# Patient Record
Sex: MALE | Race: WHITE | NOT HISPANIC OR LATINO | Employment: UNEMPLOYED | URBAN - METROPOLITAN AREA
[De-identification: names, ages, dates, MRNs, and addresses within clinical notes are randomized per-mention and may not be internally consistent; named-entity substitution may affect disease eponyms.]

---
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
## 2021-09-20 ENCOUNTER — HOSPITAL ENCOUNTER (EMERGENCY)
Facility: HOSPITAL | Age: 21
Discharge: HOME | End: 2021-09-20
Admitting: EMERGENCY MEDICINE

## 2021-09-20 VITALS
TEMPERATURE: 98 F | RESPIRATION RATE: 16 BRPM | DIASTOLIC BLOOD PRESSURE: 88 MMHG | BODY MASS INDEX: 26.59 KG/M2 | HEART RATE: 75 BPM | WEIGHT: 159.61 LBS | HEIGHT: 65 IN | OXYGEN SATURATION: 98 % | SYSTOLIC BLOOD PRESSURE: 145 MMHG
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
## 2021-09-20 DIAGNOSIS — S01.85XA DOG BITE OF FACE, INITIAL ENCOUNTER: Primary | ICD-10-CM

## 2021-09-20 PROCEDURE — 25010000002 TDAP 5-2.5-18.5 LF-MCG/0.5 SUSPENSION: Performed by: NURSE PRACTITIONER

## 2021-09-20 PROCEDURE — 90471 IMMUNIZATION ADMIN: CPT | Performed by: NURSE PRACTITIONER

## 2021-09-20 PROCEDURE — 99282 EMERGENCY DEPT VISIT SF MDM: CPT

## 2021-09-20 PROCEDURE — 90715 TDAP VACCINE 7 YRS/> IM: CPT | Performed by: NURSE PRACTITIONER

## 2021-09-20 RX ORDER — AMOXICILLIN AND CLAVULANATE POTASSIUM 875; 125 MG/1; MG/1
1 TABLET, FILM COATED ORAL EVERY 12 HOURS
Qty: 20 TABLET | Refills: 0 | Status: SHIPPED | OUTPATIENT
Start: 2021-09-20 | End: 2021-09-30

## 2021-09-20 RX ADMIN — TETANUS TOXOID, REDUCED DIPHTHERIA TOXOID AND ACELLULAR PERTUSSIS VACCINE, ADSORBED 0.5 ML: 5; 2.5; 8; 8; 2.5 SUSPENSION INTRAMUSCULAR at 15:47

## 2021-09-20 NOTE — ED PROVIDER NOTES
"Subjective   20-year-old male presents with complaint of a dog bite to his right cheek.  Patient stated \"my girlfriend was playing with her dog, it is a Rottweiler lab mix.  He cried at me but I did take that as a warning so went to kiss him on the cheek and he bit me.\"  Reports the dogs vaccines are up-to-date.  He is unsure of his tetanus status.  Denies any medical history. Denies recent antibiotic use.     1. Location: right cheek  2. Quality: burning  3. Severity: mild   4. Worsening factors: palpation  5. Alleviating factors: denies  6. Onset: PTA  7. Radiation: denies  8. Frequency: intermittent  9. Co-morbidities: No past medical history on file.  10. Source: patient            Review of Systems   Skin: Positive for wound. Negative for color change, pallor and rash.   All other systems reviewed and are negative.      No past medical history on file.    No Known Allergies    No past surgical history on file.    No family history on file.    Social History     Socioeconomic History   • Marital status: Single     Spouse name: Not on file   • Number of children: Not on file   • Years of education: Not on file   • Highest education level: Not on file           Objective   Physical Exam  Vitals and nursing note reviewed.   Constitutional:       General: He is awake. He is not in acute distress.     Appearance: Normal appearance. He is well-developed and normal weight.   HENT:      Head: Normocephalic. Laceration present.     Eyes:      Extraocular Movements: Extraocular movements intact.      Conjunctiva/sclera: Conjunctivae normal.      Pupils: Pupils are equal, round, and reactive to light.   Musculoskeletal:      Cervical back: Normal range of motion and neck supple. No tenderness.   Skin:     General: Skin is warm and dry.   Neurological:      Mental Status: He is alert and oriented to person, place, and time.   Psychiatric:         Mood and Affect: Mood normal.         Behavior: Behavior normal. Behavior is " cooperative.         Thought Content: Thought content normal.         Judgment: Judgment normal.         Laceration Repair    Date/Time: 9/20/2021 3:52 PM  Performed by: Kami Marin APRN  Authorized by: Kami Marin APRN     Consent:     Consent obtained:  Verbal    Consent given by:  Patient    Risks discussed:  Poor cosmetic result and infection  Anesthesia (see MAR for exact dosages):     Anesthesia method:  Local infiltration    Local anesthetic:  Lidocaine 2% w/o epi  Laceration details:     Location:  Face    Face location:  R cheek    Length (cm):  1  Repair type:     Repair type:  Simple  Exploration:     Hemostasis achieved with:  Direct pressure    Contaminated: no    Treatment:     Area cleansed with:  Betadine and saline    Amount of cleaning:  Extensive    Irrigation solution:  Sterile saline    Irrigation method:  Syringe    Visualized foreign bodies/material removed: no    Skin repair:     Repair method:  Sutures    Suture size:  6-0    Suture material:  Chromic gut    Suture technique:  Simple interrupted    Number of sutures:  1  Approximation:     Approximation:  Loose  Post-procedure details:     Dressing:  Antibiotic ointment    Patient tolerance of procedure:  Tolerated well, no immediate complications  Comments:      Patient was given the option for closing the wound with close approximation with the risk of infection, versus loose approximation and scarring.  Explained to patient that he is at high risk for infection due to animal bite.  He verbalized understanding and was agreeable to loose approximation.               ED Course    No radiology results for the last day  Medications   Tdap (BOOSTRIX) injection 0.5 mL (0.5 mL Intramuscular Given 9/20/21 1547)     Labs Reviewed - No data to display                                         MDM  Number of Diagnoses or Management Options  Dog bite of face, initial encounter  Diagnosis management comments: Chart Review: 12/11/2019 patient was  "seen at this facility for foreign body in his lip.  Comorbidity: No past medical history on file.    Patient undressed and placed in gown for exam.  Appropriate PPE worn during patient exam. 20-year-old male presents with complaint of a dog bite to his right cheek.  Patient stated \"my girlfriend was playing with her dog, it is a Rottweiler lab mix.  He cried at me but I did take that as a warning so went to kiss him on the cheek and he bit me.\"  Reports the dogs vaccines are up-to-date.  He is unsure of his tetanus status.  Denies any medical history. Denies recent antibiotic use.  Explained to patient that he is at high risk for infection due to animal bite.  He verbalized understanding and was agreeable to loose approximation.  See procedure for further.  Patient was encouraged to cleanse the wound at least 3 times daily with antibacterial soap and water then apply bacitracin.  Okay to be open to air.  He was offered referral to plastics and declined.  He is also given a prescription for Augmentin.  He did not have primary care established was given follow-up with patient connection and encouraged to have his wound checked in 2 days.    Disposition/Treatment: Discussed results with patient, verbalized understanding.  Discussed reasons to return to the ER, patient verbalized understanding.  Agreeable with plan of care.  Patient was stable upon discharge.    EMR Dragon transcription disclaimer:  Some of this encounter note is an electronic transcription translation of spoken language to printed text. The electronic translation of spoken language may permit erroneous, or at times, nonsensical words or phrases to be inadvertently transcribed; Although I have reviewed the note for such errors some may still exist.           Patient Progress  Patient progress: stable      Final diagnoses:   Dog bite of face, initial encounter       ED Disposition  ED Disposition     ED Disposition Condition Comment    Discharge Stable  "          PATIENT CONNECTION - [facility] [address]  [phone]  Schedule an appointment as soon as possible for a visit on 9/22/2021  For wound re-check    [facility] EMERGENCY DEPARTMENT  [address] [address] [phone]  Go to   If symptoms worsen         Medication List      New Prescriptions    amoxicillin-clavulanate 875-125 MG per tablet  Commonly known as: AUGMENTIN  Take 1 tablet by mouth Every 12 (Twelve) Hours for 10 days.           Where to Get Your Medications      These medications were sent to [id number] - [place], [place] - [address] - [phone]  - [phone]  [address] [place] [address]    Phone: [phone]   · amoxicillin-clavulanate 875-125 MG per tablet          Kami Marin, APRN  09/20/21 1608

## 2021-09-20 NOTE — DISCHARGE INSTRUCTIONS
Cleanse wound with antibacterial soap and water at least 3 times daily and apply bacitracin ointment.  Take antibiotics as prescribed with food.  I recommend taking over-the-counter probiotics or taking with yogurt.  It is important to establish a primary care provider for your primary care needs.  Call patient connection to establish care.  Have wound checked in 2 days.  As discussed, the suture that was placed will dissolve on its own.  Return to the ER for new or worsening symptoms.

## 2022-12-02 ENCOUNTER — HOSPITAL ENCOUNTER (EMERGENCY)
Facility: HOSPITAL | Age: 22
Discharge: LEFT BEFORE DISCHARGE | End: 2022-12-02
Attending: EMERGENCY MEDICINE

## 2022-12-02 VITALS
DIASTOLIC BLOOD PRESSURE: 87 MMHG | RESPIRATION RATE: 20 BRPM | OXYGEN SATURATION: 98 % | TEMPERATURE: 98.8 F | HEART RATE: 104 BPM | WEIGHT: 153.22 LBS | SYSTOLIC BLOOD PRESSURE: 127 MMHG | HEIGHT: 65 IN | BODY MASS INDEX: 25.53 KG/M2

## 2022-12-02 PROCEDURE — 99211 OFF/OP EST MAY X REQ PHY/QHP: CPT | Performed by: EMERGENCY MEDICINE

## 2025-04-22 ENCOUNTER — HOSPITAL ENCOUNTER (EMERGENCY)
Facility: HOSPITAL | Age: 25
Discharge: HOME | End: 2025-04-22
Admitting: EMERGENCY MEDICINE
Payer: MEDICAID

## 2025-04-22 VITALS
SYSTOLIC BLOOD PRESSURE: 143 MMHG | RESPIRATION RATE: 20 BRPM | BODY MASS INDEX: 31.4 KG/M2 | HEIGHT: 65 IN | OXYGEN SATURATION: 94 % | TEMPERATURE: 98.7 F | DIASTOLIC BLOOD PRESSURE: 93 MMHG | HEART RATE: 69 BPM | WEIGHT: 188.49 LBS

## 2025-04-22 DIAGNOSIS — S71.111A LACERATION OF RIGHT THIGH, INITIAL ENCOUNTER: Primary | ICD-10-CM

## 2025-04-22 PROCEDURE — 90715 TDAP VACCINE 7 YRS/> IM: CPT | Performed by: NURSE PRACTITIONER

## 2025-04-22 PROCEDURE — 90471 IMMUNIZATION ADMIN: CPT | Performed by: NURSE PRACTITIONER

## 2025-04-22 PROCEDURE — 25010000002 TETANUS-DIPHTH-ACELL PERTUSSIS 5-2.5-18.5 LF-MCG/0.5 SUSPENSION PREFILLED SYRINGE: Performed by: NURSE PRACTITIONER

## 2025-04-22 PROCEDURE — 99282 EMERGENCY DEPT VISIT SF MDM: CPT

## 2025-04-22 RX ORDER — CEPHALEXIN 500 MG/1
500 CAPSULE ORAL 2 TIMES DAILY
Qty: 10 CAPSULE | Refills: 0 | Status: SHIPPED | OUTPATIENT
Start: 2025-04-22 | End: 2025-04-27

## 2025-04-22 RX ADMIN — TETANUS TOXOID, REDUCED DIPHTHERIA TOXOID AND ACELLULAR PERTUSSIS VACCINE, ADSORBED 0.5 ML: 5; 2.5; 8; 8; 2.5 SUSPENSION INTRAMUSCULAR at 16:22

## 2025-04-22 NOTE — DISCHARGE INSTRUCTIONS
Staples should be removed in 7 days  Keep area clean and dry, may remove dressing and shower in 24 hours  Follow up with pcp for wound check

## 2025-04-22 NOTE — ED PROVIDER NOTES
Subjective   .  Chief Complaint   Patient presents with    Laceration     Laceration to right leg with  area bandaged at triage.           History of Present Illness  -year-old male presents the ED for evaluation of laceration to his right upper thigh.  He reports that he was cutting boxes, and cut his right leg reports blade was new, intact.  Unsure of last tetanus immunization.        Review of Systems    No past medical history on file.    No Known Allergies    No past surgical history on file.    No family history on file.    Social History     Socioeconomic History    Marital status: Single           Objective   Physical Exam  Vitals and nursing note reviewed.   Constitutional:       Appearance: Normal appearance.   Cardiovascular:      Rate and Rhythm: Normal rate and regular rhythm.   Musculoskeletal:        Legs:    Skin:     General: Skin is warm and dry.      Capillary Refill: Capillary refill takes less than 2 seconds.   Neurological:      Mental Status: He is alert and oriented to person, place, and time.         Laceration Repair    Date/Time: 4/22/2025 4:41 PM    Performed by: Alla Olmos APRN  Authorized by: Alla Olmos APRN    Consent:     Consent obtained:  Verbal    Consent given by:  Patient    Risks, benefits, and alternatives were discussed: yes      Risks discussed:  Infection, pain, retained foreign body, tendon damage, poor cosmetic result, need for additional repair, nerve damage, poor wound healing and vascular damage  Universal protocol:     Procedure explained and questions answered to patient or proxy's satisfaction: yes      Relevant documents present and verified: yes      Test results available: yes      Imaging studies available: yes      Required blood products, implants, devices, and special equipment available: yes      Site/side marked: yes      Immediately prior to procedure, a time out was called: yes      Patient identity confirmed:   Verbally with patient, arm band, provided demographic data and hospital-assigned identification number  Anesthesia:     Anesthesia method:  Local infiltration    Local anesthetic:  Lidocaine 1% w/o epi  Laceration details:     Location:  Leg    Leg location:  R upper leg    Length (cm):  1  Pre-procedure details:     Preparation:  Patient was prepped and draped in usual sterile fashion  Exploration:     Hemostasis achieved with:  Direct pressure    Wound exploration: entire depth of wound visualized    Treatment:     Area cleansed with:  Saline, povidone-iodine and chlorhexidine    Amount of cleaning:  Extensive    Irrigation solution:  Sterile saline  Skin repair:     Repair method:  Staples    Number of staples:  2  Approximation:     Approximation:  Close  Repair type:     Repair type:  Simple  Post-procedure details:     Dressing:  Non-adherent dressing    Procedure completion:  Tolerated             ED Course                                                       Medical Decision Making  Problems Addressed:  Laceration of right thigh, initial encounter: complicated acute illness or injury    Risk  Prescription drug management.    24-year-old male presents the ED for laceration of right upper thigh.  Superficial nature.  Repaired with staples.  Please see above procedure note.  Will discharge home with antibiotics.  Discussed discharge instruction, staple removal, plan of care, verbalized understanding    Final diagnoses:   Laceration of right thigh, initial encounter       ED Disposition  ED Disposition       ED Disposition   Discharge    Condition   Stable    Comment   --               [facility] EMERGENCY DEPARTMENT  [address] [address] [phone]        PATIENT CONNECTION - [facility] [address]  [phone]  Schedule an appointment as soon as possible for a visit   Call for assistance with follow up with Primary care provider-call tomorrow.         Medication  List        New Prescriptions      cephalexin 500 MG capsule  Commonly known as: KEFLEX  Take 1 capsule by mouth 2 (Two) Times a Day for 5 days.               Where to Get Your Medications        These medications were sent to [id number] - [place], [place] - [address] - [phone]  - [phone]   [address] [place] [address]      Phone: [phone]   cephalexin 500 MG capsule            Alla Olmos, APRN  04/22/25 6409